# Patient Record
Sex: FEMALE | Race: WHITE | NOT HISPANIC OR LATINO | Employment: UNEMPLOYED | URBAN - METROPOLITAN AREA
[De-identification: names, ages, dates, MRNs, and addresses within clinical notes are randomized per-mention and may not be internally consistent; named-entity substitution may affect disease eponyms.]

---

## 2021-01-19 PROBLEM — R13.10 DYSPHAGIA: Status: ACTIVE | Noted: 2021-01-19

## 2023-12-20 PROBLEM — Z15.01 BRCA1 GENE MUTATION POSITIVE: Status: ACTIVE | Noted: 2023-12-20

## 2023-12-20 PROBLEM — Z15.09 BRCA1 GENE MUTATION POSITIVE: Status: ACTIVE | Noted: 2023-12-20

## 2024-01-29 PROBLEM — Z90.710 STATUS POST LAPAROSCOPIC HYSTERECTOMY: Status: ACTIVE | Noted: 2024-01-29

## 2024-01-29 PROBLEM — E66.9 OBESITY: Status: ACTIVE | Noted: 2024-01-29

## 2024-01-29 PROBLEM — I10 HYPERTENSION: Status: ACTIVE | Noted: 2024-01-29

## 2024-04-16 ENCOUNTER — DOCUMENTATION ONLY (OUTPATIENT)
Dept: REHABILITATION | Facility: HOSPITAL | Age: 59
End: 2024-04-16

## 2024-04-16 NOTE — PROGRESS NOTES
No Show Note/Documentation    Patient: Scarlet Guajardo  Date of Session: 4/16/2024  Diagnosis: No diagnosis found.  MRN: [id number]    Scarlet Guajardo did not attend her scheduled therapy evaluation today. She did not call to cancel nor reschedule. She does not have any follow up appointments at this time. No charges have been posted today.     Cancel: 0  No show: 1    Yisel Lazo, PT   4/16/2024

## 2024-05-08 PROBLEM — Z90.13 S/P BILATERAL MASTECTOMY: Status: ACTIVE | Noted: 2024-05-08

## 2024-06-05 PROBLEM — T81.30XA WOUND DEHISCENCE: Status: ACTIVE | Noted: 2024-06-05
